# Patient Record
Sex: MALE | Race: WHITE | NOT HISPANIC OR LATINO | URBAN - METROPOLITAN AREA
[De-identification: names, ages, dates, MRNs, and addresses within clinical notes are randomized per-mention and may not be internally consistent; named-entity substitution may affect disease eponyms.]

---

## 2020-06-24 ENCOUNTER — EMERGENCY (EMERGENCY)
Facility: HOSPITAL | Age: 70
LOS: 1 days | Discharge: HOME | End: 2020-06-24
Admitting: EMERGENCY MEDICINE
Payer: MEDICARE

## 2020-06-24 VITALS
OXYGEN SATURATION: 96 % | TEMPERATURE: 98 F | HEART RATE: 62 BPM | DIASTOLIC BLOOD PRESSURE: 67 MMHG | SYSTOLIC BLOOD PRESSURE: 127 MMHG | RESPIRATION RATE: 16 BRPM

## 2020-06-24 VITALS
RESPIRATION RATE: 16 BRPM | SYSTOLIC BLOOD PRESSURE: 120 MMHG | TEMPERATURE: 99 F | HEART RATE: 68 BPM | OXYGEN SATURATION: 99 % | WEIGHT: 179.9 LBS | DIASTOLIC BLOOD PRESSURE: 70 MMHG

## 2020-06-24 DIAGNOSIS — R41.82 ALTERED MENTAL STATUS, UNSPECIFIED: ICD-10-CM

## 2020-06-24 DIAGNOSIS — F10.120 ALCOHOL ABUSE WITH INTOXICATION, UNCOMPLICATED: ICD-10-CM

## 2020-06-24 PROCEDURE — 99284 EMERGENCY DEPT VISIT MOD MDM: CPT | Mod: 25

## 2020-06-24 PROCEDURE — 72125 CT NECK SPINE W/O DYE: CPT | Mod: 26

## 2020-06-24 PROCEDURE — 71045 X-RAY EXAM CHEST 1 VIEW: CPT | Mod: 26

## 2020-06-24 PROCEDURE — 70450 CT HEAD/BRAIN W/O DYE: CPT | Mod: 26,77

## 2020-06-24 PROCEDURE — 70450 CT HEAD/BRAIN W/O DYE: CPT | Mod: 26

## 2020-06-24 PROCEDURE — 70486 CT MAXILLOFACIAL W/O DYE: CPT | Mod: 26

## 2020-06-24 RX ORDER — MIDAZOLAM HYDROCHLORIDE 1 MG/ML
5 INJECTION, SOLUTION INTRAMUSCULAR; INTRAVENOUS ONCE
Refills: 0 | Status: DISCONTINUED | OUTPATIENT
Start: 2020-06-24 | End: 2020-06-24

## 2020-06-24 RX ADMIN — MIDAZOLAM HYDROCHLORIDE 5 MILLIGRAM(S): 1 INJECTION, SOLUTION INTRAMUSCULAR; INTRAVENOUS at 01:20

## 2020-06-24 NOTE — ED ADULT NURSE NOTE - CHIEF COMPLAINT QUOTE
Pt erika from [facility], [facility] called by PD for pt fall. PT noted to have superficial laceration to lower lip. Pt reports alcohol use tonight. Alert and awake, responsive to verbal qs, unable to explain mechanism of fall.

## 2020-06-24 NOTE — ED ADULT NURSE NOTE - NSIMPLEMENTINTERV_GEN_ALL_ED
Implemented All Fall with Harm Risk Interventions:  [place] to call system. Call bell, personal items and telephone within reach. Instruct patient to call for assistance. Room bathroom lighting operational. Non-slip footwear when patient is off stretcher. Physically safe environment: no spills, clutter or unnecessary equipment. Stretcher in lowest position, wheels locked, appropriate side rails in place. Provide visual cue, wrist band, yellow gown, etc. Monitor gait and stability. Monitor for mental status changes and reorient to person, place, and time. Review medications for side effects contributing to fall risk. Reinforce activity limits and safety measures with patient and family. Provide visual clues: red socks.

## 2020-06-24 NOTE — ED ADULT TRIAGE NOTE - CHIEF COMPLAINT QUOTE
Pt erika from [facility], [facility] called by PD for pt fall. PT noted to have superficial laceration to lower lip. Pt reports alcohol use tonight. Alert and awake, responsive to verbal qs, unable to explain mechanism of fall.

## 2020-06-24 NOTE — ED PROVIDER NOTE - OBJECTIVE STATEMENT
Pt BIBA for public EtOH intox. Admits to having heavy EtOH intake today.  Denies drug use or other illicits. EMS reports patient fell in the park and hit face/head.  Minimally cooperative with hx and ROS due to heavy intox. No respiratory distress, vomiting, or urinary/bowel incontinence noted.

## 2020-06-24 NOTE — ED PROVIDER NOTE - PATIENT PORTAL LINK FT
You can access the FollowMyHealth Patient Portal offered by [facility] by registering at the following website: [email or web address]. By joining [facility]’s FollowMyHealth portal, you will also be able to view your health information using other applications (apps) compatible with our system.

## 2020-06-24 NOTE — ED ADULT NURSE REASSESSMENT NOTE - NS ED NURSE REASSESS COMMENT FT1
Pt asleep in stretcher, breathing spontaneous and nonlabored. No indicators of pain present. Noted low O2 sat, placed pt on O2 NC 4 LPM and sat patient up. Pt O2 sat improved to 94-96%.

## 2020-06-24 NOTE — ED PROVIDER NOTE - CLINICAL SUMMARY MEDICAL DECISION MAKING FREE TEXT BOX
Pt belligerent and uncooperative in ED, repeatedly trying to elope with unsteady gait. Versed 5mg IM administered for safety of patient and staff. Will order CT Head and Maxillofacial, and continue to observe and reassess for clinical sobriety.

## 2020-06-24 NOTE — ED PROVIDER NOTE - PROGRESS NOTE DETAILS
CT Maxillofacial shows no fractures, however CT Head reveals questionable small hemorrhagic contusions vs cavernous hemangiomas. Pt is table and comfortable at this time. Will order a 6-hour repeat CT Head and continue to observe and reassess. Pt lying comfortably in NAD at this time. Repeat CT pending. Will sign pt out to [facility] at 8am shift change pending CT and reassessment. pt awake but somnolent. resting comfortably. pt with cough, CXR added. CTH resolved. CXR clear. will continue to monitor for clinical sobriety pt awake, alert, pleasant, requesting discharge. CTH resolved. CXR with mild atelectasis, possibly early infiltrate. will continue to monitor for clinical sobriety pt awake, alert, pleasant, requesting discharge. discussed CXR showing atelectasis vs possible early infiltrate. pt reports his cough is chronic and he does not want to take medications. denies fever, chills, chest pain, sob. strict return precautions given.

## 2020-06-24 NOTE — ED ADULT NURSE NOTE - CHPI ED NUR SYMPTOMS NEG
no vomiting/no abdominal distension/no nausea/no abdominal pain/no pain/no fever/no weakness/no chills

## 2020-06-24 NOTE — ED ADULT NURSE REASSESSMENT NOTE - NS ED NURSE REASSESS COMMENT FT1
Pt agitated and refusing to lay in stretcher. Attempting to ambulate (unsteadily) through ED and attempting to lay down on the floor. Multiple attempts to put patient back in stretcher and explain why he should remain there. MD aware.

## 2020-06-24 NOTE — ED ADULT NURSE NOTE - OBJECTIVE STATEMENT
68 yo M BIBA for AMS. Pt found in [facility] s/p fall. Noted abrasion to pt upper and lower lip with minimal bleeding, no other injuries noted. Pt poor historian, unable to recall HPI. Admits to alcohol use. Denies CP, SOB, N/V/D, headache, dizziness, fever/chills, numbness/tingling, change in bowel or bladder habits. Pt speaking in full complete sentences, +[place]ULL[place].

## 2020-06-24 NOTE — ED PROVIDER NOTE - DIAGNOSTIC INTERPRETATION
Chest x-ray interpreted by [facility] PA: Cindy Mills  Findings: heart size normal, no infiltrates, lungs fully expanded, soft tissues appear normal.

## 2020-06-24 NOTE — ED ADULT NURSE REASSESSMENT NOTE - NS ED NURSE REASSESS COMMENT FT1
Pt sleeping/resting in bed at this time, responsive to voice. Safety precautions maintained. Will continue to monitor.

## 2020-06-24 NOTE — ED PROVIDER NOTE - NSFOLLOWUPINSTRUCTIONS_ED_ALL_ED_FT
Alcohol Abuse    Alcohol intoxication occurs when the amount of alcohol that a person has consumed impairs his or her ability to mentally and physically function. Chronic alcohol consumption can also lead to a variety of health issues including neurological disease, stomach disease, heart disease, liver disease, etc. Do not drive after drinking alcohol. Drinking enough alcohol to end up in an Emergency Room suggests you may have an alcohol abuse problem. Seek help at a drug addiction center.    SEEK IMMEDIATE MEDICAL CARE IF YOU HAVE ANY OF THE FOLLOWING SYMPTOMS: seizures, vomiting blood, blood in your stool, lightheadedness/dizziness, or becoming shaky to tremulous when you stop drinking. Alcohol Abuse    Alcohol intoxication occurs when the amount of alcohol that a person has consumed impairs his or her ability to mentally and physically function. Chronic alcohol consumption can also lead to a variety of health issues including neurological disease, stomach disease, heart disease, liver disease, etc. Do not drive after drinking alcohol. Drinking enough alcohol to end up in an Emergency Room suggests you may have an alcohol abuse problem. Seek help at a drug addiction center.    SEEK IMMEDIATE MEDICAL CARE IF YOU HAVE ANY OF THE FOLLOWING SYMPTOMS: seizures, vomiting blood, blood in your stool, lightheadedness/dizziness, or becoming shaky to tremulous when you stop drinking.    THERE IS A LITTLE FLUID AT THE BOTTOM OF BOTH OF YOUR LUNGS THAT COULD BE AN EARLY INFECTION. IF YOU DEVELOP FEVER, CHILLS, TROUBLE BREATHING, OR ANY OTHER CONCERNS, PLEASE RETURN TO THE ER.